# Patient Record
Sex: FEMALE
[De-identification: names, ages, dates, MRNs, and addresses within clinical notes are randomized per-mention and may not be internally consistent; named-entity substitution may affect disease eponyms.]

---

## 2018-05-29 ENCOUNTER — HOSPITAL ENCOUNTER (EMERGENCY)
Dept: HOSPITAL 14 - H.ER | Age: 53
Discharge: HOME | End: 2018-05-29
Payer: SELF-PAY

## 2018-05-29 VITALS — TEMPERATURE: 98.2 F | HEART RATE: 78 BPM | SYSTOLIC BLOOD PRESSURE: 120 MMHG | DIASTOLIC BLOOD PRESSURE: 78 MMHG

## 2018-05-29 VITALS — RESPIRATION RATE: 18 BRPM

## 2018-05-29 VITALS — BODY MASS INDEX: 34.9 KG/M2

## 2018-05-29 DIAGNOSIS — S39.012A: Primary | ICD-10-CM
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
## 2018-05-29 DIAGNOSIS — S76.912A: ICD-10-CM
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
## 2018-05-29 PROCEDURE — 72114 X-RAY EXAM L-S SPINE BENDING: CPT

## 2018-05-29 PROCEDURE — 73552 X-RAY EXAM OF FEMUR 2/>: CPT

## 2018-05-29 PROCEDURE — 96372 THER/PROPH/DIAG INJ SC/IM: CPT

## 2018-05-29 PROCEDURE — 99283 EMERGENCY DEPT VISIT LOW MDM: CPT

## 2018-05-29 PROCEDURE — 73502 X-RAY EXAM HIP UNI 2-3 VIEWS: CPT

## 2018-05-29 NOTE — RAD
PROCEDURE:  Open



HISTORY:

fall



COMPARISON:

May 29, 2018.  



TECHNIQUE:

Standard protocol for this study/examination.



FINDINGS:

There are no osseous abnormalities to suggest fracture. The pelvic 

ring is intact. Preserved femoral-acetabular relationship. Negative 

study for protrusio, subluxation or dislocation.  Degenerative 

changes: None 



IMPRESSION:

No acute findings related to/accounting  for the clinical 

presentation.

## 2018-05-29 NOTE — ED PDOC
HPI: Back


Time Seen by Provider: 05/29/18 15:31


Chief Complaint (Nursing): Back Pain


Chief Complaint (Provider): slip/fall, back and L leg pain


History Per: Patient


History/Exam Limitations: no limitations


Onset/Duration Of Symptoms: Sudden Onset


Current Symptoms Are (Timing): Still Present


Quality Of Discomfort: Sharp


Severity: Moderate


Previous Symptoms: None


Associated Symptoms: None


Exacerbating Factor(s): Turning, Movement, Standing


Additional Complaint(s): 





53yo female states she had a slip/fall in [place] [employer] [place] about an hour prior to 

arrival. Denies neck or head injury. Notes lower back pain radiating to left 

hip and upper leg/ thigh. Denies weakness or numbness, incontinence or LOC, has 

full recall of events. 





Past Medical History


Reviewed: Historical Data, Nursing Documentation, Vital Signs


Vital Signs: 


 Last Vital Signs











Temp  98.7 F   05/29/18 14:29


 


Pulse  104 H  05/29/18 14:29


 


Resp  18   05/29/18 14:29


 


BP  119/81   05/29/18 14:29


 


Pulse Ox  98   05/29/18 14:29














- Medical History


PMH: Anemia


   Denies: Chronic Kidney Disease





- Surgical History


Surgical History: Tonsillectomy





- Family History


Family History: States: Unknown Family Hx


Other Family History: foot tendon





- Living Arrangements


Living Arrangements: With Family





- Social History


Current smoker - smoking cessation education provided: No





- Home Medications


Home Medications: 


 Ambulatory Orders











 Medication  Instructions  Recorded


 


Cephalexin [Keflex] 1 tab PO Q6 08/07/15


 


Oxycodone HCl/Acetaminophen 5 - 325 mg PO Q4 PRN 08/07/15





[Percocet 325 mg-5 mg]  


 


Naproxen [Naprosyn] 500 mg PO BID PRN #14 tablet 05/29/18














- Allergies


Allergies/Adverse Reactions: 


 Allergies











Allergy/AdvReac Type Severity Reaction Status Date / Time


 


No Known Allergies Allergy   Unverified 08/06/15 09:29














Review of Systems


Constitutional: Negative for: Fever


Cardiovascular: Negative for: Chest Pain


Gastrointestinal: Negative for: Abdominal Pain


Genitourinary Female: Negative for: Dysuria


Musculoskeletal: Positive for: Back Pain, Leg Pain.  Negative for: Neck Pain, 

Hand Pain, Foot Pain


Skin: Negative for: Rash, Lesions


Neurological: Negative for: Weakness, Numbness, Incoordination, Seizures





Physical Exam





- Reviewed


Nursing Documentation Reviewed: Yes


Vital Signs Reviewed: Yes





- Physical Exam


Appears: Positive for: Well, Non-toxic


Head Exam: Positive for: ATRAUMATIC


Skin: Positive for: Normal Color, Warm, Dry


Eye Exam: Positive for: Normal appearance, EOMI


Neck: Positive for: Normal, Painless ROM.  Negative for: Pain On Movement Of 

Neck


Cardiovascular/Chest: Positive for: Regular Rate, Rhythm


Respiratory: Positive for: Normal Breath Sounds


Back: Positive for: Vertebral Tenderness (mild lumbar tenderness), Decreased ROM


Extremity: Positive for: Tenderness (L hip and thigh no deformity, mild loss 

ROM active).  Negative for: Deformity, Swelling


Neurologic/Psych: Positive for: Alert, CNs II-XII.  Negative for: Motor/Sensory 

Deficits





- ECG


O2 Sat by Pulse Oximetry: 98


Pulse Ox Interpretation: Normal





- Radiology


X-Ray: Read By Radiologist


X-Ray Interpretation: Other (neg fractures)





Medical Decision Making


Medical Decision Making: 





workup for ground level fall initiated


XRays, analgesics, re-eval.








Required additional analgesic w percocet. 


Observed ambulating without difficulty thereafter.


DC from ED, followup PMD 3-4 days. 





Disposition





- Clinical Impression


Clinical Impression: 


 Back strain, Leg strain








- Disposition


Referrals: 


Kayla Nguyen MD [Staff Provider] - 


Disposition: Routine/Home


Disposition Time: 17:40


Condition: STABLE


Additional Instructions: 


Return to ER for any worse or new symptoms. See orthopedist for further testing 

if symptoms persist.





Prescriptions: 


Naproxen [Naprosyn] 500 mg PO BID PRN #14 tablet


 PRN Reason: Pain, Moderate (4-7)


Instructions:  Low Back Pain in Adults, Muscle Strain (DC)


Forms:  [facility] (English)

## 2018-05-29 NOTE — RAD
PROCEDURE:  Left Hip X-ray Radiographs.



HISTORY:

Posttraumatic back and hip pain.



COMPARISON:

None.



FINDINGS:



BONES:

No evidence of fracture or other acute pathologic process. 



JOINTS:

Normal. 



SOFT TISSUES:

Normal. 



OTHER FINDINGS:

None.



IMPRESSION:

Normal left hip radiographs.

## 2018-05-29 NOTE — RAD
PROCEDURE:  Radiographs of the Lumbar Spine.



HISTORY:

back pain s/p fall







COMPARISON:

No prior.



FINDINGS:



BONES:

Normal alignment. No listhesis. No fracture.



DISC SPACES:

Degenerative changes limited to the L4-5.



OTHER FINDINGS:

None.



IMPRESSION:

No acute findings related to/accounting  for the clinical 

presentation.

## 2018-06-06 VITALS — OXYGEN SATURATION: 98 %
